# Patient Record
Sex: FEMALE | Race: WHITE | URBAN - METROPOLITAN AREA
[De-identification: names, ages, dates, MRNs, and addresses within clinical notes are randomized per-mention and may not be internally consistent; named-entity substitution may affect disease eponyms.]

---

## 2022-12-19 ENCOUNTER — OFFICE VISIT (OUTPATIENT)
Dept: URBAN - METROPOLITAN AREA CLINIC 23 | Facility: CLINIC | Age: 62
End: 2022-12-19
Payer: COMMERCIAL

## 2022-12-19 DIAGNOSIS — H02.889 MEIBOMIAN GLAND DYSFUNCTION OF UNSP, UNSPECIFIED EYELID: ICD-10-CM

## 2022-12-19 DIAGNOSIS — H57.813 BROW PTOSIS, BILATERAL: ICD-10-CM

## 2022-12-19 DIAGNOSIS — Z41.1 ENCOUNTER FOR COSMETIC SURGERY: ICD-10-CM

## 2022-12-19 DIAGNOSIS — H53.40 VISUAL FIELD DEFECT: Primary | ICD-10-CM

## 2022-12-19 DIAGNOSIS — H02.834 DERMATOCHALASIS OF LEFT UPPER EYELID: ICD-10-CM

## 2022-12-19 DIAGNOSIS — H25.13 AGE-RELATED NUCLEAR CATARACT, BILATERAL: ICD-10-CM

## 2022-12-19 DIAGNOSIS — H02.413 MECHANICAL PTOSIS OF BILATERAL EYELIDS: ICD-10-CM

## 2022-12-19 PROCEDURE — 99204 OFFICE O/P NEW MOD 45 MIN: CPT | Performed by: OPHTHALMOLOGY

## 2022-12-19 PROCEDURE — 92285 EXTERNAL OCULAR PHOTOGRAPHY: CPT | Performed by: OPHTHALMOLOGY

## 2022-12-19 ASSESSMENT — INTRAOCULAR PRESSURE
OS: 14
OD: 14

## 2022-12-19 NOTE — IMPRESSION/PLAN
Impression: Brow ptosis, bilateral: H57.813. Plan: Patient examined. Chart Reviewed. Patient has signs and symptoms and findings consistent with visually significant brow ptosis (mechanical ptosis and upper eyelid dermatochalasis skin.) This was diagrammatically explained to the patient. Patient voiced understanding. Discussed known options for management (do nothing, conservative management, and surgical intervention.)  Patient elects surgical intervention at this time. Benefits and risks of direct brow ptosis  lift  and upper eyelid blepharoplasty: bleeding, infection, dry eye, asymmetry, numbness, and/or need for further surgery. Patient wishes to proceed  with surgery.  

[id number] DIRECT BROW LIFT

## 2022-12-19 NOTE — IMPRESSION/PLAN
Impression: Visual field defect: H53.40. Plan: Patient subjectively describes peripheral field defect. VF ordered. RV next available for Ptosis VF.

## 2022-12-19 NOTE — IMPRESSION/PLAN
Impression: Encounter for cosmetic surgery: Z41.1. Plan: Patient examined, chart reviewed. Patient has cosmetically significant eyelid findings. Discussed treatment options: do nothing, conservative care (moisturizers,) Rx intervention (retinoids,) and surgical intervention (blepharoplasty.) Discussed major risks (bleeding, infection, dry eye, sight loss, asymmetry,) and appropriate pre-operative prep (CeraVe Ointment on the skin of the lids at HS, and dietary factors crucial to successful wound healing.) Patient elects to proceed with cosmetic blepharoplasty.  

24106 Bilateral upper eyelid blepharoplasty, skin only

## 2022-12-19 NOTE — IMPRESSION/PLAN
Impression: Meibomian gland dysfunction of unsp, unspecified eyelid: H02.889. Plan: Patient examined. Chart reviewed. Lid scrubs and hygiene were explained. Demonstrated use of Ocusoft. Patient instructed to apply warm compresses.

## 2023-01-16 ENCOUNTER — TESTING ONLY (OUTPATIENT)
Dept: URBAN - METROPOLITAN AREA CLINIC 23 | Facility: CLINIC | Age: 63
End: 2023-01-16
Payer: COMMERCIAL

## 2023-01-16 DIAGNOSIS — H02.413 MECHANICAL PTOSIS OF BILATERAL EYELIDS: Primary | ICD-10-CM

## 2023-01-16 PROCEDURE — 92081 LIMITED VISUAL FIELD XM: CPT | Performed by: OPHTHALMOLOGY

## 2023-01-24 ENCOUNTER — OFFICE VISIT (OUTPATIENT)
Dept: URBAN - METROPOLITAN AREA CLINIC 23 | Facility: CLINIC | Age: 63
End: 2023-01-24
Payer: COMMERCIAL

## 2023-01-24 DIAGNOSIS — Z41.1 ENCOUNTER FOR COSMETIC SURGERY: Primary | ICD-10-CM

## 2023-01-24 PROCEDURE — 99214 OFFICE O/P EST MOD 30 MIN: CPT | Performed by: OPHTHALMOLOGY

## 2023-01-24 NOTE — IMPRESSION/PLAN
Impression: Encounter for cosmetic surgery: Z41.1. Plan: Patient examined, chart reviewed. Patient has cosmetically significant eyelid findings. Discussed treatment options: do nothing, conservative care (moisturizers,) Rx intervention (retinoids,) and surgical intervention (blepharoplasty.) The following issues have been addressed 1. brow shape 2. Symmetric 3. Approach area and area of brow lift and folding. Demonstrated what internal/brow lift skin only on patient eyelid area. 4. PO care discussed Patient voiced questions and concerns and will proceed with Blepharoplasty skin only.

## 2023-02-09 ENCOUNTER — OFFICE VISIT (OUTPATIENT)
Dept: URBAN - METROPOLITAN AREA CLINIC 32 | Facility: CLINIC | Age: 63
End: 2023-02-09
Payer: COMMERCIAL

## 2023-02-09 DIAGNOSIS — H02.834 DERMATOCHALASIS OF LEFT UPPER EYELID: ICD-10-CM

## 2023-02-09 DIAGNOSIS — H02.831 DERMATOCHALASIS OF RIGHT UPPER EYELID: Primary | ICD-10-CM

## 2023-02-09 PROCEDURE — 92012 INTRM OPH EXAM EST PATIENT: CPT | Performed by: OPHTHALMOLOGY

## 2023-02-09 ASSESSMENT — INTRAOCULAR PRESSURE
OS: 15
OD: 15

## 2023-02-09 NOTE — IMPRESSION/PLAN
Impression: Dermatochalasis of right upper eyelid: H02.831. Right. Condition: established, stable. Symptoms: may improve with surgery. Vision: vision not affected. Plan: Discussed diagnosis in detail with patient. Discussed treatment options with patient. No treatment is required at this time, dermatochalasis is moderate and not yet affecting vision. Patient may opt for cosmetic blepharoplasty surgery, patient will consider surgical options. I do not recommend any brow surgery, especially NOT a direct drow lift.